# Patient Record
(demographics unavailable — no encounter records)

---

## 2025-05-29 NOTE — REVIEW OF SYSTEMS
[Negative] : Respiratory [Fever] : no fever [Chills] : no chills [Nasal Discharge] : no nasal discharge [Sore Throat] : no sore throat [Chest Pain] : no chest pain [Palpitations] : no palpitations [Abdominal Pain] : no abdominal pain [Nausea] : no nausea [Constipation] : no constipation [Diarrhea] : diarrhea [Vomiting] : no vomiting [Dysuria] : no dysuria [Headache] : no headache [Dizziness] : no dizziness [FreeTextEntry7] : doesn't feel like eating as much [de-identified] : Mood doing okay-6 hours of sleep per night

## 2025-05-29 NOTE — PHYSICAL EXAM
[No Acute Distress] : no acute distress [Normal Oropharynx] : the oropharynx was normal [No Lymphadenopathy] : no lymphadenopathy [Thyroid Normal, No Nodules] : the thyroid was normal and there were no nodules present [No Edema] : there was no peripheral edema [No Extremity Clubbing/Cyanosis] : no extremity clubbing/cyanosis [Normal] : affect was normal and insight and judgment were intact [de-identified] : no abdominal pain

## 2025-05-29 NOTE — HISTORY OF PRESENT ILLNESS
[FreeTextEntry1] : Here for follow-up; needs med refills. [de-identified] : Here for follow-up; needs med refills. Saw [id number]-[id number] in 1 year. Saw Gyn within the year.   [place] 3x a week; 30 min on bike, weights and walking.  Was at 141 on home scale-   May 14th was last injection-has been out of medication.   -May 15th 146lbs on patient tracking ekaterina.  Has been tolerating.

## 2025-05-29 NOTE — REVIEW OF SYSTEMS
[Negative] : Respiratory [Fever] : no fever [Chills] : no chills [Nasal Discharge] : no nasal discharge [Sore Throat] : no sore throat [Chest Pain] : no chest pain [Palpitations] : no palpitations [Abdominal Pain] : no abdominal pain [Nausea] : no nausea [Constipation] : no constipation [Diarrhea] : diarrhea [Vomiting] : no vomiting [Dysuria] : no dysuria [Headache] : no headache [Dizziness] : no dizziness [FreeTextEntry7] : doesn't feel like eating as much [de-identified] : Mood doing okay-6 hours of sleep per night

## 2025-05-29 NOTE — HISTORY OF PRESENT ILLNESS
[FreeTextEntry1] : Here for follow-up; needs med refills. [de-identified] : Here for follow-up; needs med refills. Saw [id number]-aocyyq-tv in 1 year. Saw Gyn within the year.   [place] 3x a week; 30 min on bike, weights and walking.  Was at 141 on home scale-   May 14th was last injection-has been out of medication.   -May 15th 146lbs on patient tracking ekaterina.  Has been tolerating.

## 2025-05-29 NOTE — PHYSICAL EXAM
[No Acute Distress] : no acute distress [Normal Oropharynx] : the oropharynx was normal [No Lymphadenopathy] : no lymphadenopathy [Thyroid Normal, No Nodules] : the thyroid was normal and there were no nodules present [No Edema] : there was no peripheral edema [No Extremity Clubbing/Cyanosis] : no extremity clubbing/cyanosis [Normal] : affect was normal and insight and judgment were intact [de-identified] : no abdominal pain

## 2025-05-29 NOTE — PLAN
[FreeTextEntry1] : Fasting today. HTN-recheck CMP.  Repeat BP still elevated.  Previously in range-continue medication. Discussed blood pressure monitoring and normal ranges for BP.  Keep BP log.  Advised elevated readings or symptoms require immediate evaluation.  Advised patient if they have a home blood pressure cuff, they may bring it with them to their next visit and compare machine readings.    HLD-recheck lipid panel, a1c.  Continue atorvastatin.  Overweight-tolerating Zepbound.  Continue current dosage.  No side effects. Side effects and precautions discussed.    Will adjust meds based on labs.  Patient expressed understanding of plan.